# Patient Record
Sex: MALE
[De-identification: names, ages, dates, MRNs, and addresses within clinical notes are randomized per-mention and may not be internally consistent; named-entity substitution may affect disease eponyms.]

---

## 2023-10-02 ENCOUNTER — APPOINTMENT (OUTPATIENT)
Dept: HEART AND VASCULAR | Facility: CLINIC | Age: 28
End: 2023-10-02
Payer: COMMERCIAL

## 2023-10-02 PROBLEM — Z00.00 ENCOUNTER FOR PREVENTIVE HEALTH EXAMINATION: Status: ACTIVE | Noted: 2023-10-02

## 2023-10-02 PROCEDURE — 99205 OFFICE O/P NEW HI 60 MIN: CPT

## 2024-05-18 DIAGNOSIS — Q27.9 CONGENITAL MALFORMATION OF PERIPHERAL VASCULAR SYSTEM, UNSPECIFIED: ICD-10-CM

## 2024-05-20 ENCOUNTER — APPOINTMENT (OUTPATIENT)
Dept: HEART AND VASCULAR | Facility: CLINIC | Age: 29
End: 2024-05-20
Payer: COMMERCIAL

## 2024-05-20 DIAGNOSIS — M25.561 PAIN IN RIGHT KNEE: ICD-10-CM

## 2024-05-20 PROCEDURE — 99214 OFFICE O/P EST MOD 30 MIN: CPT

## 2024-05-20 PROCEDURE — G2211 COMPLEX E/M VISIT ADD ON: CPT | Mod: NC,1L

## 2024-05-21 PROBLEM — M25.561 KNEE PAIN, RIGHT: Status: ACTIVE | Noted: 2024-05-21

## 2024-05-21 NOTE — HISTORY OF PRESENT ILLNESS
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 The lesion is fairly typical in appearance and an ultrasound in the office revealed a compressible intramuscular malformation. He was seen last October, and now wish to schedule the procedure. He is doing well with the same symptoms.      of note:  His hemophilia is currently managed by a hematologist, Dr. Nicole Sanders in [place] [phone]. Will consult with Dr Bass here to coordinate the hemophilia management before and after the embolization. He is currently on the medications Hemlibra 80mg weekly and Xyntha 3000 units for any acute bleeding.

## 2024-05-21 NOTE — END OF VISIT
[FreeTextEntry3] : I, Dr. Peres, personally performed the evaluation and management (E/M) services for this established patient who presents today with (a) new problem(s)/exacerbation of (an) existing condition(s). That E/M includes conducting the examination, assessing all new/exacerbated conditions, and establishing a new plan of care. Today, my ACP, Michelle RILEY, was here to observe my evaluation and management services for this new problem/exacerbated condition to be followed going forward.

## 2024-05-21 NOTE — ASSESSMENT
[Venous malformation] : venous malformation [FreeTextEntry1] : ====================================================================== Israel Williamson is a 28-year-old male who we saw once last fall for a painful intramuscular venous malformation in the right supra patellar area. He has pain and tenderness in the in the area especially on prolonged standing or exercise. He had an MRI which clearly shows a localized intramuscular venous malformation measuring approximately 3 centimeters in diameter. The leg looks normal on physical exam, there are no overlying skin changes and neuromuscular function is intact. We did an ultrasound last time in the office which clearly showed the malformation.  There was some delay in getting him on the schedule due to insurance issues which have now been resolved. Has past history is remarkable for hemophilia and he has an outside hematologist who treats him for this. We will arrange for a consultation with Dr Bass here to coordinate his hemophilia care in the periprocedural period. He has no known allergies.